# Patient Record
Sex: FEMALE | Race: WHITE
[De-identification: names, ages, dates, MRNs, and addresses within clinical notes are randomized per-mention and may not be internally consistent; named-entity substitution may affect disease eponyms.]

---

## 2017-12-24 ENCOUNTER — HOSPITAL ENCOUNTER (EMERGENCY)
Dept: HOSPITAL 25 - UCCORT | Age: 37
Discharge: HOME | End: 2017-12-24
Payer: COMMERCIAL

## 2017-12-24 VITALS — SYSTOLIC BLOOD PRESSURE: 120 MMHG | DIASTOLIC BLOOD PRESSURE: 76 MMHG

## 2017-12-24 DIAGNOSIS — Z72.89: Primary | ICD-10-CM

## 2017-12-24 DIAGNOSIS — Z87.891: ICD-10-CM

## 2017-12-24 DIAGNOSIS — J20.9: ICD-10-CM

## 2017-12-24 PROCEDURE — G0463 HOSPITAL OUTPT CLINIC VISIT: HCPCS

## 2017-12-24 PROCEDURE — 99212 OFFICE O/P EST SF 10 MIN: CPT

## 2017-12-24 NOTE — UC
Respiratory Complaint HPI





- HPI Summary


HPI Summary: 


cough and congestion for 2-3 days used sons Albuterol MDI with relief of SX








- History of Current Complaint


Chief Complaint: UCRespiratory


Stated Complaint: CONGESTION/HA


Time Seen by Provider: 17 10:29


Hx Obtained From: Patient


Hx Last Menstrual Period: 17


Pregnant?: No


Onset/Duration: Sudden Onset, Lasting Days - 3, Still Present


Timing: Constant


Severity Initially: Moderate


Severity Currently: Moderate


Character: Cough: Nonproductive


Aggravating Factors: Deep Breaths, Recumbent Position


Alleviating Factors: Bronchodilator


Associated Signs And Symptoms: Positive: Pleuritic Chest Pain, URI





- Allergies/Home Medications


Allergies/Adverse Reactions: 


 Allergies











Allergy/AdvReac Type Severity Reaction Status Date / Time


 


Morphine Allergy Mild Itching Verified 17 10:15











Home Medications: 


 Home Medications





Doxylamine-Dm [Nite Time Cough 6.25-15 mg/15Ml] 1 liq PO ONCE PRN 17 [

History Confirmed 17]











PMH/Surg Hx/FS Hx/Imm Hx


Previously Healthy: Yes





- Surgical History


Surgical History: Yes


Surgery Procedure, Year, and Place:  x 2





- Family History


Known Family History: Positive: None


   Negative: Diabetes





- Social History


Occupation: Employed Full-time


Lives: With Family


Alcohol Use: Occasionally


Substance Use Type: None


Smoking Status (MU): Former Smoker


Have You Smoked in the Last Year: No


When Did the Patient Quit Smoking/Using Tobacco: 2005





- Immunization History


Most Recent Influenza Vaccination: not this season [id number]


Most Recent Tetanus Shot: not sure





Review of Systems


Constitutional: Negative


Skin: Negative


Eyes: Negative


ENT: Negative


Respiratory: Cough


Cardiovascular: Negative


Gastrointestinal: Negative


Genitourinary: Negative


Motor: Negative


Neurovascular: Negative


Musculoskeletal: Negative


Neurological: Headache


Psychological: Negative


Is Patient Immunocompromised?: No


All Other Systems Reviewed And Are Negative: Yes





Physical Exam


Triage Information Reviewed: Yes


Appearance: Well-Appearing, No Pain Distress, Well-Nourished


Vital Signs: 


 Initial Vital Signs











Temp  97.9 F   17 10:16


 


Pulse  72   17 10:16


 


Resp  20   17 10:16


 


BP  120/76   17 10:16


 


Pulse Ox  98   17 10:16











Vital Signs Reviewed: Yes


Eye Exam: Normal


Eyes: Positive: Conjunctiva Clear


ENT Exam: Normal


ENT: Positive: Normal ENT inspection, Hearing grossly normal, Pharynx normal, 

TMs normal, Uvula midline.  Negative: Nasal congestion, Tonsillar swelling, 

Tonsillar exudate, Trismus, Muffled voice, Hoarse voice, Sinus tenderness


Dental Exam: Normal


Neck exam: Normal


Neck: Positive: Supple, Nontender


Respiratory Exam: Normal


Respiratory: Positive: Chest non-tender, Lungs clear, Normal breath sounds, No 

respiratory distress, No accessory muscle use


Cardiovascular Exam: Normal


Cardiovascular: Positive: RRR, No Murmur, Pulses Normal, Brisk Capillary Refill


Musculoskeletal Exam: Normal


Musculoskeletal: Positive: Strength Intact, ROM Intact, No Edema


Neurological Exam: Normal


Neurological: Positive: Alert, Muscle Tone Normal


Psychological Exam: Normal


Skin Exam: Normal





UC Diagnostic Evaluation





- Laboratory


O2 Sat by Pulse Oximetry: 98





Respiratory Course/Dx





- Course


Course Of Treatment: Albuterol, prednisone, may add antibiotic in 3-5 days 

should sx worsen or fail to improve, encouraged patient to get flu vaccine





- Differential Dx/Diagnosis


Provider Diagnoses: Bronchitis with bronchospasm





Discharge





- Discharge Plan


Condition: Stable


Disposition: HOME


Prescriptions: 


Albuterol HFA INHALER* [Ventolin HFA Inhaler*] 2 puff INH Q4H PRN #1 mdi


 PRN Reason: cough/chest tightness


Azithromycin TAB* [Zithromax TAB (Z-MARIBETH) 250 mg #6 tabs] 2 tab PO .TODAY, THEN 

1 DAILY #1 maribeth


predniSONE TAB* [Deltasone TAB*] 20 mg PO DAILY #12 tab


Patient Education Materials:  How to Use a Metered-Dose Inhaler (ED), 

Bronchospasm (ED)


Referrals: 


Tadeo Narayanan DO [Primary Care Provider] - If Needed


Additional Instructions: 


Start antibiotic for symptoms worsen or have not improved in the next 3-5 days

## 2018-12-09 ENCOUNTER — HOSPITAL ENCOUNTER (EMERGENCY)
Dept: HOSPITAL 25 - UCCORT | Age: 38
Discharge: ANOTHER HOSPITAL | End: 2018-12-09
Payer: COMMERCIAL

## 2018-12-09 VITALS — SYSTOLIC BLOOD PRESSURE: 140 MMHG | DIASTOLIC BLOOD PRESSURE: 84 MMHG

## 2018-12-09 DIAGNOSIS — Z88.5: ICD-10-CM

## 2018-12-09 DIAGNOSIS — R51: Primary | ICD-10-CM

## 2018-12-09 DIAGNOSIS — Z87.891: ICD-10-CM

## 2018-12-09 DIAGNOSIS — H53.8: ICD-10-CM

## 2018-12-09 PROCEDURE — 99213 OFFICE O/P EST LOW 20 MIN: CPT

## 2018-12-09 PROCEDURE — G0463 HOSPITAL OUTPT CLINIC VISIT: HCPCS

## 2018-12-09 NOTE — UC
Headache HPI





- HPI Summary


HPI Summary: 


SEVERAL HOURS AGO PATIENT SUDDENLY DEVELOPED VISUAL DISTURBANCE DESCRIBED AS 

"SEEING STRIPS OF HOLOGRAPHIC SHAPES AND LIGHTS".  A COUPLE OF HOURS LATER SHE 

DEVELOPED A HEADACHE WHICH SHE DESCRIBED AS A PRESSURE AROUND HER WHOLE HEAD.  

SHE HAS ASSOCIATED NAUSEA AND DIZZINESS AND MILD PHOTOPHOBIA.  STATES SHE GETS 

HEADACHES FROM TIME TO TIME BUT NOT MIGRAINES AND NOTHING LIKE THIS.  TOOK 4 

OTC IBUPROFEN WITH NO RELIEF IN SYMPTOMS.  FEELS LIKE HER FINGERS ARE TINGLING 

A LITTLE BIT.  DENIES ANY FOCAL WEAKNESS. OF NOTE PT STARTED OCP ABOUT 2 MONTHS 

AGO. NON SMOKER. 





- History Of Current Complaint


Chief Complaint: UCHeadache


Stated Complaint: HEADACHE,CHANGE IN VISION


Time Seen by Provider: 18 16:07


Hx Obtained From: Patient


Hx Last Menstrual Period: 18


Onset/Duration: Sudden Onset, Lasting Hours, Still Present


Onset Of Symptoms: Sudden, Still Present


Pain Intensity: 7


Pain Scale Used: 0-10 Numeric


Timing: Constant


Character: Pressure


Location of Headache: Diffuse


Aggravating Factor(s): Nothing


Allevating Factor(s): Nothing


Associated Signs And Symptoms: Positive: Dizziness, Nausea.  Negative: Vomiting

, Fever, Neck Pain, Neck Stiffness, Decreased LOC





- Allergies/Home Medications


Allergies/Adverse Reactions: 


 Allergies











Allergy/AdvReac Type Severity Reaction Status Date / Time


 


morphine Allergy Mild Itching Verified 18 16:07











Home Medications: 


 Home Medications





Bcp 1 tab DAILY 18 [History Confirmed 18]











PMH/Surg Hx/FS Hx/Imm Hx





- Additional Past Medical History


Additional PMH: 





PCOS





- Surgical History


Surgical History: Yes


Surgery Procedure, Year, and Place:  x 2.  RIGHT KNEE





- Family History


Known Family History: Positive: None


   Negative: Diabetes, Blood Disorder





- Social History


Alcohol Use: Occasionally


Substance Use Type: None


Smoking Status (MU): Former Smoker


Have You Smoked in the Last Year: No


When Did the Patient Quit Smoking/Using Tobacco: 2005





- Immunization History


Most Recent Influenza Vaccination: not this season [id number]


Most Recent Tetanus Shot: utd





Review of Systems


All Other Systems Reviewed And Are Negative: Yes


Constitutional: Positive: Negative


Eyes: Positive: Photophobia, Other - VISUAL DISTURBANCE


ENT: Positive: Negative


Respiratory: Positive: Negative


Cardiovascular: Positive: Negative


Gastrointestinal: Positive: Nausea


Neurological: Positive: Headache, Paresthesia





Physical Exam


Triage Information Reviewed: Yes


Appearance: Well-Appearing, No Pain Distress, Well-Nourished


Vital Signs: 


 Initial Vital Signs











Temp  97.1 F   18 16:07


 


Pulse  69   18 16:07


 


Resp  22   18 16:07


 


BP  140/84   18 16:07


 


Pulse Ox  100   18 16:07











Vital Signs Reviewed: Yes


Eyes: Positive: Conjunctiva Clear


ENT: Positive: Hearing grossly normal, Pharynx normal, TMs normal


Neck: Positive: Supple, Nontender, No Lymphadenopathy


Respiratory Exam: Normal


Cardiovascular Exam: Normal


Abdomen Description: Positive: Soft


Musculoskeletal: Positive: No Edema


Neurological: Positive: Alert, Other: - CN II-XII GROSSLY INTACT BILATERALLY. 

RAPID ALTERNATING MOVEMENTS INTACT. NEG PRONATOR DRIFT. 5/5 STRENGTH. HEEL TO 

SHIN INTACT BILATERALLY. FINGER TO NOSE INTACT.


Psychological: Positive: Age Appropriate Behavior


Skin: Negative: Rashes





Headache Course/Dx





- Course


Course Of Treatment: PT OFFERED TRANSPORT TO THE ED BY AMBULANCE BUT DECLINES. 

ADVISED THAT BY NOT TRAVELING IN A MONITORED SETTING SHE COULD BE RISKING 

WORSENING OF HER CONDITION THAT COULD POSE A THREAT TO HER LIFE, HEALTH AND 

MEDICAL SAFETY. SHE VERBALIZES UNDERSTANDING AND CONTINUES TO DECLINE AMBULANCE 

TRANSFER.





- Differential Dx/Diagnosis


Provider Diagnosis: 


 Headache, Visual disturbance








- Physician Notifications


Discussed Patient Care With: DR. MAGDA ASCENCIO - TO [facility] ED BY PRIVATE CAR


Time Discussed With Above Provider: 16:35


Instructed by Provider To: MD Will See In ED





Discharge





- Sign-Out/Discharge


Documenting (check all that apply): Patient Departure


All imaging exams completed and their final reports reviewed: No Studies





- Discharge Plan


Condition: Stable


Disposition: TRANS HIGHER LVL OF CARE FAC


Patient Education Materials:  General Headache (ED)


Referrals: 


Tadeo Narayanan DO [Primary Care Provider] - If Needed


Additional Instructions: 


GO DIRECTLY TO THE [facility] ED FROM HERE FOR FURTHER EVALUATION.





YOU HAVE DECLINED TRANSFER TO THE ED BY AMBULANCE. BE ADVISED THAT BY NOT 

TRAVELING IN A MONITORED SETTING YOU COULD BE RISKING WORSENING OF YOUR 

CONDITION THAT COULD POSE A THREAT TO YOUR LIFE, HEALTH AND MEDICAL SAFETY.





YOU RECEIVED 4MG OF ZOFRAN PRIOR TO DEPARTURE FROM THE .





- Billing Disposition and Condition


Condition: STABLE


Disposition: Trans Higher Lvl of Care Fac